# Patient Record
Sex: MALE | Race: WHITE | NOT HISPANIC OR LATINO
[De-identification: names, ages, dates, MRNs, and addresses within clinical notes are randomized per-mention and may not be internally consistent; named-entity substitution may affect disease eponyms.]

---

## 2022-03-14 PROBLEM — Z00.00 ENCOUNTER FOR PREVENTIVE HEALTH EXAMINATION: Status: ACTIVE | Noted: 2022-03-14

## 2022-03-16 ENCOUNTER — APPOINTMENT (OUTPATIENT)
Dept: ORTHOPEDIC SURGERY | Facility: CLINIC | Age: 72
End: 2022-03-16
Payer: MEDICARE

## 2022-03-16 ENCOUNTER — NON-APPOINTMENT (OUTPATIENT)
Age: 72
End: 2022-03-16

## 2022-03-16 PROCEDURE — 99203 OFFICE O/P NEW LOW 30 MIN: CPT | Mod: 25

## 2022-03-16 PROCEDURE — 20550 NJX 1 TENDON SHEATH/LIGAMENT: CPT | Mod: LT

## 2022-03-16 NOTE — PROCEDURE
[FreeTextEntry1] : My impression is that this patient has stenosing tenosynovitis of the finger, more commonly known as a trigger finger.  I recommended that the patient undergo a trigger finger injection. The risks, benefits, and alternatives were discussed with the patient in detail. This included (but was not limited to) pain, infection, subcutaneous atrophy, skin depigmentation, etc... They agreed to undergo the steroid injection. Under informed consent and sterile conditions, 1/2 cc of 1% plain lidocaine  and 1 cc of Dexamethasone 4mg was precisely injected into the patient's bilateral middle digits.   A sterile Band-Aid was placed.  It is my hope that this significantly alleviates the patient's symptoms.

## 2022-03-16 NOTE — PHYSICAL EXAM
[de-identified] : Bilateral hands: \par There is positive swelling and tenderness localized to the A1 pulley of the bilateral middle digits with locking upon compression of the pulley.. There is no evidence of infection. No tenderness of the MP, PIP or DIP joint and no evidence of instability bilaterally. The FDS FDP and extensor mechanism is intact without instability and with 5 over 5 strength bilaterally in the digits. [de-identified] : no xray imaging taken today

## 2022-03-16 NOTE — HISTORY OF PRESENT ILLNESS
[FreeTextEntry1] : 72 year male presents for evaluation of bilateral middle finger pain and locking. He reports his pain is localized to the region of the A1 pulley of the bilateral middle fingers, with associated locking of the digits upon flexion and use of his hands. He denies treatment to date for the condition with steroid injections. He reports minimal relief with over the counter medication. He reports limitation in hand use 2/2 locking.

## 2022-03-16 NOTE — ASSESSMENT
[FreeTextEntry1] : 72-year-old male presents with pain and locking of his bilateral middle fingers, exam findings consistent with trigger finger.  Patient has been informed of the findings, and the nature and history of the condition was discussed at length.  Risks and benefits of continued observation versus steroid injection versus surgical intervention with trigger finger release were discussed.  Patient has elected to proceed with continued conservative treatment, received an initial steroid injection to the bilateral middle fingers today, and tolerated the procedures well.  He will follow up in 4 weeks if symptoms persist for consideration for repeat injection versus surgical intervention. All questions and concerns have been addressed, and the patient is in agreement with the above plan.

## 2022-04-13 ENCOUNTER — APPOINTMENT (OUTPATIENT)
Dept: ORTHOPEDIC SURGERY | Facility: CLINIC | Age: 72
End: 2022-04-13
Payer: MEDICARE

## 2022-04-13 DIAGNOSIS — M65.331 TRIGGER FINGER, RIGHT MIDDLE FINGER: ICD-10-CM

## 2022-04-13 DIAGNOSIS — M65.332 TRIGGER FINGER, LEFT MIDDLE FINGER: ICD-10-CM

## 2022-04-13 PROCEDURE — 99214 OFFICE O/P EST MOD 30 MIN: CPT

## 2022-04-13 NOTE — ASSESSMENT
[FreeTextEntry1] : 72-year-old male presents with pain and locking of his bilateral middle fingers secondary to trigger finger.  He has worsening symptoms despite the initial steroid injection given last month.  Risks and benefits of continued observation versus repeat steroid injection versus surgical intervention with right middle trigger finger release have been discussed at length.  The patient has elected to proceed with surgical intervention, and will be booked for a right middle trigger finger release accordingly. All questions and concerns have been addressed, and the patient is in agreement with the above plan.

## 2022-04-13 NOTE — PHYSICAL EXAM
[de-identified] : Bilateral hands: \par There is positive swelling and tenderness localized to the A1 pulley of the bilateral middle digits (right>left) with locking upon compression of the pulley.. There is no evidence of infection. No tenderness of the MP, PIP or DIP joint and no evidence of instability bilaterally. The FDS FDP and extensor mechanism is intact without instability and with 5 over 5 strength bilaterally in the digits. [de-identified] : no xray imaging taken today

## 2022-04-13 NOTE — HISTORY OF PRESENT ILLNESS
[FreeTextEntry1] : 72 year male presents for evaluation of bilateral middle finger pain and locking. He reports his pain is localized to the region of the A1 pulley of the bilateral middle fingers, with associated locking of the digits upon flexion and use of his hands. He denies treatment to date for the condition with steroid injections. He reports minimal relief with over the counter medication. He reports limitation in hand use 2/2 locking. \par \par 04/13/2022: Patient presents today for reevaluation of bilateral middle trigger finger.  He is status post initial injection on March 16 and reports since the injection he has had severe worsening pain to the region of the A1 pulley of the right.  He notes persistent locking despite the injections of bilateral middle fingers and presents today to discuss surgical intervention.

## 2022-05-09 DIAGNOSIS — Z01.818 ENCOUNTER FOR OTHER PREPROCEDURAL EXAMINATION: ICD-10-CM

## 2022-05-18 LAB — SARS-COV-2 N GENE NPH QL NAA+PROBE: NOT DETECTED

## 2022-05-20 ENCOUNTER — APPOINTMENT (OUTPATIENT)
Dept: ORTHOPEDIC SURGERY | Facility: AMBULATORY MEDICAL SERVICES | Age: 72
End: 2022-05-20
Payer: MEDICARE

## 2022-05-20 PROCEDURE — 26055 INCISE FINGER TENDON SHEATH: CPT | Mod: F7

## 2022-05-31 ENCOUNTER — APPOINTMENT (OUTPATIENT)
Dept: ORTHOPEDIC SURGERY | Facility: CLINIC | Age: 72
End: 2022-05-31
Payer: MEDICARE

## 2022-05-31 DIAGNOSIS — Z98.890 OTHER SPECIFIED POSTPROCEDURAL STATES: ICD-10-CM

## 2022-05-31 PROCEDURE — 99024 POSTOP FOLLOW-UP VISIT: CPT

## 2022-05-31 NOTE — HISTORY OF PRESENT ILLNESS
[Doing Well] : is doing well [Excellent Pain Control] : has excellent pain control [No Sign of Infection] : is showing no signs of infection [de-identified] : Procedure: Right middle trigger finger release\par DOS: 05/20/2022 [de-identified] : Patient presents 11 days status post right middle trigger finger release, doing well. He complains of slight pain and stiffness of the right hand.  [de-identified] : The surgical incision site was clean, dry and intact, not erythematous, not swollen and not dehisced. Unable to make composite fist.  [de-identified] : He was recommended for hand therapy as well as gentle at home exercise to improve range of motion. Prescription for hand therapy given today.  He will follow up in 1 month for reassessment.

## 2022-05-31 NOTE — ADDENDUM
[FreeTextEntry1] : ICyndee assisted with documentation on 05/31/2022  acting as scribe for Dr. Joi Garcia. 
intermittent/sudden onset

## 2022-06-28 ENCOUNTER — APPOINTMENT (OUTPATIENT)
Dept: ORTHOPEDIC SURGERY | Facility: CLINIC | Age: 72
End: 2022-06-28

## 2022-12-30 ENCOUNTER — APPOINTMENT (OUTPATIENT)
Dept: PULMONOLOGY | Facility: CLINIC | Age: 72
End: 2022-12-30

## 2023-04-26 ENCOUNTER — APPOINTMENT (OUTPATIENT)
Dept: PULMONOLOGY | Facility: CLINIC | Age: 73
End: 2023-04-26
Payer: MEDICARE

## 2023-04-26 VITALS
HEART RATE: 60 BPM | BODY MASS INDEX: 29.8 KG/M2 | RESPIRATION RATE: 14 BRPM | WEIGHT: 220 LBS | HEIGHT: 72 IN | SYSTOLIC BLOOD PRESSURE: 118 MMHG | DIASTOLIC BLOOD PRESSURE: 80 MMHG | OXYGEN SATURATION: 97 %

## 2023-04-26 DIAGNOSIS — Z87.891 PERSONAL HISTORY OF NICOTINE DEPENDENCE: ICD-10-CM

## 2023-04-26 DIAGNOSIS — Z86.79 PERSONAL HISTORY OF OTHER DISEASES OF THE CIRCULATORY SYSTEM: ICD-10-CM

## 2023-04-26 DIAGNOSIS — Z86.39 PERSONAL HISTORY OF OTHER ENDOCRINE, NUTRITIONAL AND METABOLIC DISEASE: ICD-10-CM

## 2023-04-26 PROCEDURE — 94010 BREATHING CAPACITY TEST: CPT

## 2023-04-26 PROCEDURE — 99205 OFFICE O/P NEW HI 60 MIN: CPT | Mod: 25

## 2023-04-26 RX ORDER — METOPROLOL SUCCINATE 25 MG/1
25 TABLET, EXTENDED RELEASE ORAL
Refills: 0 | Status: ACTIVE | COMMUNITY

## 2023-04-26 RX ORDER — LOSARTAN POTASSIUM 25 MG/1
25 TABLET, FILM COATED ORAL
Refills: 0 | Status: ACTIVE | COMMUNITY

## 2023-04-26 RX ORDER — ATORVASTATIN CALCIUM 40 MG/1
40 TABLET, FILM COATED ORAL
Refills: 0 | Status: ACTIVE | COMMUNITY

## 2023-04-26 RX ORDER — OXYCODONE AND ACETAMINOPHEN 5; 325 MG/1; MG/1
5-325 TABLET ORAL
Qty: 20 | Refills: 0 | Status: DISCONTINUED | COMMUNITY
Start: 2022-05-20 | End: 2023-04-26

## 2023-04-26 RX ORDER — ASPIRIN 81 MG
81 TABLET, DELAYED RELEASE (ENTERIC COATED) ORAL
Refills: 0 | Status: ACTIVE | COMMUNITY

## 2023-04-26 RX ORDER — ALBUTEROL SULFATE 90 UG/1
108 (90 BASE) INHALANT RESPIRATORY (INHALATION)
Qty: 1 | Refills: 5 | Status: ACTIVE | COMMUNITY
Start: 2023-04-26 | End: 1900-01-01

## 2023-04-26 RX ORDER — HYDROCHLOROTHIAZIDE 12.5 MG/1
12.5 TABLET ORAL
Refills: 0 | Status: ACTIVE | COMMUNITY

## 2023-04-26 NOTE — DISCUSSION/SUMMARY
[FreeTextEntry1] : COPD Gold class III, Emphysema GG opacities on prior CT scan\par Spirometry as above\par Current exam is normal, normal sp02\par Will place on Trelegy 100 daily and technique reviewed\par Repeat CT chest for GG opacities\par Remains smoke free, no fevers or purulent sputum\par Cardiology follow up Dr Arias\par 3 months or sooner if needed\par

## 2023-04-26 NOTE — HISTORY OF PRESENT ILLNESS
[TextBox_4] : 35 yr smoker, quit 10 yrs ago\par SOB x years, when bringing in groceries \par no fever, chill,chest pain\par has some mucus in Am, white\par Saw Dr Arias , told not Cardiac related\par has Rescue inhaler, uses when SOB, used 1 in last few months\par dog at home\par worked for [employer]

## 2023-04-26 NOTE — CONSULT LETTER
[Dear  ___] : Dear  [unfilled], [Consult Letter:] : I had the pleasure of evaluating your patient, [unfilled]. [Please see my note below.] : Please see my note below. [Sincerely,] : Sincerely, [FreeTextEntry3] : Benedict [facility]res DO [facility]\par Pulmonary Critical Care\par Director Pulmonary Division\par Medical Director Respiratory Therapy\par [facility]\par \par  [DrSweetie  ___] : Dr. CLEARY

## 2023-04-26 NOTE — PHYSICAL EXAM
[No Acute Distress] : no acute distress [Normal Appearance] : normal appearance [Normal Rate/Rhythm] : normal rate/rhythm [No Murmurs] : no murmurs [No Rubs] : no rubs [No Gallops] : no gallops [No Resp Distress] : no resp distress [No Acc Muscle Use] : no acc muscle use [Normal Rhythm and Effort] : normal rhythm and effort [Clear to Auscultation Bilaterally] : clear to auscultation bilaterally [Normal to Percussion] : normal to percussion [No Abnormalities] : no abnormalities [Normal Gait] : normal gait [No Clubbing] : no clubbing [No Cyanosis] : no cyanosis [No Edema] : no edema [FROM] : FROM [Normal Color/ Pigmentation] : normal color/ pigmentation [No Focal Deficits] : no focal deficits [Oriented x3] : oriented x3 [Normal Affect] : normal affect

## 2023-04-26 NOTE — PROCEDURE
[FreeTextEntry1] : CT chest 11/22\par emphysema, bronchial wall thickening\par faint GG RUL, LLL\par \par Echo 12/22 EF nl, post op changes\par no PH, RV normal\par \par Spirometry with mod severe air flow obstruction

## 2023-07-26 ENCOUNTER — APPOINTMENT (OUTPATIENT)
Dept: PULMONOLOGY | Facility: CLINIC | Age: 73
End: 2023-07-26

## 2023-12-08 RX ORDER — FLUTICASONE FUROATE, UMECLIDINIUM BROMIDE AND VILANTEROL TRIFENATATE 100; 62.5; 25 UG/1; UG/1; UG/1
100-62.5-25 POWDER RESPIRATORY (INHALATION)
Qty: 1 | Refills: 5 | Status: ACTIVE | COMMUNITY
Start: 2023-04-26 | End: 1900-01-01

## 2024-02-23 ENCOUNTER — APPOINTMENT (OUTPATIENT)
Dept: PULMONOLOGY | Facility: CLINIC | Age: 74
End: 2024-02-23
Payer: MEDICARE

## 2024-02-23 VITALS
HEART RATE: 63 BPM | WEIGHT: 223 LBS | SYSTOLIC BLOOD PRESSURE: 124 MMHG | DIASTOLIC BLOOD PRESSURE: 72 MMHG | HEIGHT: 72 IN | RESPIRATION RATE: 14 BRPM | BODY MASS INDEX: 30.2 KG/M2 | OXYGEN SATURATION: 98 %

## 2024-02-23 PROCEDURE — G2211 COMPLEX E/M VISIT ADD ON: CPT

## 2024-02-23 PROCEDURE — 99215 OFFICE O/P EST HI 40 MIN: CPT

## 2024-02-23 NOTE — HISTORY OF PRESENT ILLNESS
[Former] : former [>= 20 pack years] : >= 20 pack years [TextBox_4] : 33 yr smoker, quit 10 yrs ago SOB x years, when bringing in groceries  no fever, chill,chest pain has some mucus in Am, white Saw Dr Arias , told not Cardiac related has Rescue inhaler, uses when SOB, used 1 in last few months dog at home worked for [employer]  2/23/24 At baseline Using Trelegy 100 no fever, chill, chest pain cardiology Dr Arias Chronic HEARN no change [YearQuit] : 2013

## 2024-02-23 NOTE — DISCUSSION/SUMMARY
[FreeTextEntry1] : COPD Gold class III, Emphysema ,stable nodule on CT scan 2/24 Spirometry as above Current exam is normal, normal sp02 Continue  Trelegy 100 daily and technique reviewed Remains smoke free, no fevers or purulent sputum Cardiology follow up Dr Arias 4 months or sooner if needed with spirometry

## 2024-02-23 NOTE — CONSULT LETTER
[Dear  ___] : Dear  [unfilled], [Consult Letter:] : I had the pleasure of evaluating your patient, [unfilled]. [Please see my note below.] : Please see my note below. [Sincerely,] : Sincerely, [FreeTextEntry3] : Benedict Man[facility]res DO [facility]\par  Pulmonary Critical Care\par  Director Pulmonary Division\par  Medical Director Respiratory Therapy\par  [facility]\par  \par   [DrSweetie  ___] : Dr. CLEARY

## 2024-02-23 NOTE — PROCEDURE
[FreeTextEntry1] : CT 2/24: minute nodules stable, midl emphysema  Spirometry with mod severe air flow obstruction

## 2024-06-28 ENCOUNTER — APPOINTMENT (OUTPATIENT)
Dept: PULMONOLOGY | Facility: CLINIC | Age: 74
End: 2024-06-28
Payer: MEDICARE

## 2024-06-28 VITALS — BODY MASS INDEX: 29.26 KG/M2 | WEIGHT: 209 LBS | HEIGHT: 71 IN

## 2024-06-28 VITALS
RESPIRATION RATE: 16 BRPM | HEART RATE: 70 BPM | OXYGEN SATURATION: 96 % | HEIGHT: 71 IN | DIASTOLIC BLOOD PRESSURE: 71 MMHG | SYSTOLIC BLOOD PRESSURE: 113 MMHG | WEIGHT: 209 LBS | BODY MASS INDEX: 29.26 KG/M2

## 2024-06-28 DIAGNOSIS — J43.9 EMPHYSEMA, UNSPECIFIED: ICD-10-CM

## 2024-06-28 DIAGNOSIS — R91.8 OTHER NONSPECIFIC ABNORMAL FINDING OF LUNG FIELD: ICD-10-CM

## 2024-06-28 PROCEDURE — 94010 BREATHING CAPACITY TEST: CPT

## 2024-06-28 PROCEDURE — 99214 OFFICE O/P EST MOD 30 MIN: CPT | Mod: 25

## 2024-12-09 ENCOUNTER — RX RENEWAL (OUTPATIENT)
Age: 74
End: 2024-12-09

## 2024-12-12 ENCOUNTER — APPOINTMENT (OUTPATIENT)
Dept: PULMONOLOGY | Facility: CLINIC | Age: 74
End: 2024-12-12